# Patient Record
Sex: FEMALE | Race: WHITE | NOT HISPANIC OR LATINO | Employment: FULL TIME | URBAN - METROPOLITAN AREA
[De-identification: names, ages, dates, MRNs, and addresses within clinical notes are randomized per-mention and may not be internally consistent; named-entity substitution may affect disease eponyms.]

---

## 2017-09-13 ENCOUNTER — LAB REQUISITION (OUTPATIENT)
Dept: LAB | Facility: HOSPITAL | Age: 22
End: 2017-09-13

## 2017-09-13 DIAGNOSIS — Z48.817 ENCOUNTER FOR SURGICAL AFTERCARE FOLLOWING SURGERY OF SKIN OR SUBCUTANEOUS TISSUE: ICD-10-CM

## 2017-09-13 PROCEDURE — 87077 CULTURE AEROBIC IDENTIFY: CPT | Performed by: DERMATOLOGY

## 2017-09-13 PROCEDURE — 87186 SC STD MICRODIL/AGAR DIL: CPT | Performed by: DERMATOLOGY

## 2017-09-13 PROCEDURE — 87147 CULTURE TYPE IMMUNOLOGIC: CPT | Performed by: DERMATOLOGY

## 2017-09-13 PROCEDURE — 87070 CULTURE OTHR SPECIMN AEROBIC: CPT | Performed by: DERMATOLOGY

## 2017-09-13 PROCEDURE — 87205 SMEAR GRAM STAIN: CPT | Performed by: DERMATOLOGY

## 2017-09-17 LAB
BACTERIA SPEC AEROBE CULT: ABNORMAL
GRAM STN SPEC: ABNORMAL

## 2021-02-24 ENCOUNTER — OFFICE VISIT (OUTPATIENT)
Dept: OBSTETRICS AND GYNECOLOGY | Facility: CLINIC | Age: 26
End: 2021-02-24

## 2021-02-24 VITALS — WEIGHT: 147 LBS | HEIGHT: 63 IN | BODY MASS INDEX: 26.05 KG/M2

## 2021-02-24 DIAGNOSIS — N92.6 IRREGULAR MENSES: ICD-10-CM

## 2021-02-24 DIAGNOSIS — L68.0 FEMALE HIRSUTISM: ICD-10-CM

## 2021-02-24 DIAGNOSIS — Z01.419 ENCOUNTER FOR ANNUAL ROUTINE GYNECOLOGICAL EXAMINATION: Primary | ICD-10-CM

## 2021-02-24 PROCEDURE — 99395 PREV VISIT EST AGE 18-39: CPT | Performed by: NURSE PRACTITIONER

## 2021-02-24 PROCEDURE — 99213 OFFICE O/P EST LOW 20 MIN: CPT | Performed by: NURSE PRACTITIONER

## 2021-02-24 RX ORDER — FLUOXETINE HYDROCHLORIDE 20 MG/1
CAPSULE ORAL
COMMUNITY
Start: 2021-02-11

## 2021-02-24 NOTE — PROGRESS NOTES
GYN Annual Exam     CC- Here for annual exam.     Subjective     HPI  Shari Jordan is a 25 y.o. female established patient who presents for annual well woman exam. Her periods are irregular, occurring every 3 months, lasting 4 days and are moderate and clots are present.  She reports moderate dysmenorrhea.  SPartner Status: Marital Status: engaged.  New Partners since last visit: YES/NO/Other: no.  Condom usage with IC: never.    Pt c/o irregular periods, occurring approx every 3 months. Pt also c/o hair on her chest, neck and face.    She exercises regularly: yes.  She has concerns about domestic violence: no.    OB History        0    Para   0    Term   0       0    AB   0    Living   0       SAB   0    TAB   0    Ectopic   0    Molar   0    Multiple   0    Live Births   0                Current contraception: none  History of abnormal Pap smear: no  Family history of uterine, colon or ovarian cancer: yes - breast cancer paternal grandmother  Family history of breast cancer: yes - paternal grandmother  H/o STDs: No  Last pap:  Gardasil:uncertain if she received the vaccine  Thromboembolic Disease: none    Health Maintenance   Topic Date Due   • Annual Gynecologic Pelvic and Breast Exam  1997   • ANNUAL PHYSICAL  2000   • HPV VACCINES (1 - 2-dose series) 2008   • TDAP/TD VACCINES (1 - Tdap) 2016   • INFLUENZA VACCINE  2020   • HEPATITIS C SCREENING  2021   • CHLAMYDIA SCREENING  2021   • Pneumococcal Vaccine 0-64  Aged Out   • MENINGOCOCCAL VACCINE  Aged Out       The following portions of the patient's history were reviewed and updated as appropriate: allergies, current medications, past family history, past medical history, past social history, past surgical history and problem list.    Review of Systems  Review of Systems   Constitutional: Negative.    HENT: Negative.         Abn hair growth   Eyes: Negative.    Respiratory: Negative.    Cardiovascular:  "Negative.    Gastrointestinal: Negative.    Endocrine: Negative.    Genitourinary:        Irreg periods   Musculoskeletal: Negative.    Skin: Negative.    Allergic/Immunologic: Negative.    Neurological: Negative.    Hematological: Negative.    Psychiatric/Behavioral: Negative.        I have reviewed and agree with the HPI, ROS, and historical information as entered above. ZOILA Cabezas      Past Medical History:   Diagnosis Date   • Anxiety    • Aortic regurgitation     Mild to Moderate; followed by Dr. Michel   • History of MRSA infection 2011    Right Ring Finger   • Irregular menses    • Pott's disease    • SVT (supraventricular tachycardia) (CMS/HCC)    • Vaccine for human papilloma virus (HPV) types 6, 11, 16, and 18 administered     Gardasil Series Completed        Past Surgical History:   Procedure Laterality Date   • DENTAL PROCEDURE     • OTHER SURGICAL HISTORY      Urethral Surgery - Performed as infant   • WISDOM TOOTH EXTRACTION            Objective   Ht 160 cm (63\")   Wt 66.7 kg (147 lb)   LMP 12/21/2020 (Exact Date)   BMI 26.04 kg/m²     Physical Exam  Vitals signs and nursing note reviewed. Exam conducted with a chaperone present.   Constitutional:       Appearance: Normal appearance. She is well-developed.   HENT:      Head: Normocephalic and atraumatic.      Comments: Mild hirsutism noted along chest and neck  Neck:      Musculoskeletal: Normal range of motion. No muscular tenderness.      Thyroid: No thyroid mass or thyromegaly.   Cardiovascular:      Rate and Rhythm: Normal rate and regular rhythm.      Heart sounds: No murmur.   Pulmonary:      Effort: Pulmonary effort is normal. No retractions.      Breath sounds: Normal breath sounds. No wheezing, rhonchi or rales.   Chest:      Chest wall: No mass or tenderness.      Breasts:         Right: Normal. No mass, nipple discharge, skin change or tenderness.         Left: Normal. No mass, nipple discharge, skin change or tenderness. "   Abdominal:      General: Bowel sounds are normal.      Palpations: Abdomen is soft. Abdomen is not rigid. There is no mass.      Tenderness: There is no abdominal tenderness. There is no guarding.      Hernia: No hernia is present. There is no hernia in the left inguinal area.   Genitourinary:     Labia:         Right: No rash, tenderness or lesion.         Left: No rash, tenderness or lesion.       Vagina: Normal. No vaginal discharge or lesions.      Cervix: No cervical motion tenderness, discharge, lesion or cervical bleeding.      Uterus: Normal. Not enlarged, not fixed and not tender.       Adnexa:         Right: No mass or tenderness.          Left: No mass or tenderness.        Rectum: No external hemorrhoid.   Neurological:      Mental Status: She is alert and oriented to person, place, and time.   Psychiatric:         Behavior: Behavior normal.          Assessment   Assessment  Problem List Items Addressed This Visit     None      Visit Diagnoses     Encounter for annual routine gynecological examination    -  Primary    Relevant Orders    Liquid-based Pap Smear, Screening    Irregular menses        Relevant Orders    Insulin, Random    Glucose, Random    CBC (No Diff)    DHEA-Sulfate    17-Hydroxyprogesterone    Testosterone - total    TSH    FSH & LH    Hemoglobin A1c     Non-ob Transvaginal    Female hirsutism        Relevant Orders    Insulin, Random    Glucose, Random    CBC (No Diff)    DHEA-Sulfate    17-Hydroxyprogesterone    Testosterone - total    TSH    FSH & LH    Hemoglobin A1c            Plan     1. Encouraged use of condoms for STD prevention.  2. Reviewed monthly self breast exams.  Instructed to call with lumps, pain, or breast discharge.    3. Reviewed exercise as a preventative health measures.   4. Reccommended Flu Vaccine in Fall of each year.  5. Other: RTC for pelvic U/S and review labs, evaluate irregular menses.          Tami Abernathy, ZOILA  02/24/2021

## 2021-03-01 LAB
17OHP SERPL-MCNC: 229 NG/DL
DHEA-S SERPL-MCNC: 599 UG/DL (ref 110–431.7)
ERYTHROCYTE [DISTWIDTH] IN BLOOD BY AUTOMATED COUNT: 12.4 % (ref 12.3–15.4)
FSH SERPL-ACNC: 2.2 MIU/ML
GLUCOSE SERPL-MCNC: 77 MG/DL (ref 65–99)
HBA1C MFR BLD: 4.9 % (ref 4.8–5.6)
HCT VFR BLD AUTO: 39.2 % (ref 34–46.6)
HGB BLD-MCNC: 13.1 G/DL (ref 12–15.9)
INSULIN SERPL-ACNC: 6.7 UIU/ML
LH SERPL-ACNC: 3.5 MIU/ML
MCH RBC QN AUTO: 31.3 PG (ref 26.6–33)
MCHC RBC AUTO-ENTMCNC: 33.4 G/DL (ref 31.5–35.7)
MCV RBC AUTO: 93.8 FL (ref 79–97)
PLATELET # BLD AUTO: 368 10*3/MM3 (ref 140–450)
RBC # BLD AUTO: 4.18 10*6/MM3 (ref 3.77–5.28)
TESTOST SERPL-MCNC: 37 NG/DL (ref 8–48)
TSH SERPL DL<=0.005 MIU/L-ACNC: 1.07 UIU/ML (ref 0.27–4.2)
WBC # BLD AUTO: 7.16 10*3/MM3 (ref 3.4–10.8)

## 2021-03-04 DIAGNOSIS — Z01.419 ENCOUNTER FOR ANNUAL ROUTINE GYNECOLOGICAL EXAMINATION: ICD-10-CM

## 2021-03-10 ENCOUNTER — OFFICE VISIT (OUTPATIENT)
Dept: OBSTETRICS AND GYNECOLOGY | Facility: CLINIC | Age: 26
End: 2021-03-10

## 2021-03-10 VITALS
DIASTOLIC BLOOD PRESSURE: 80 MMHG | HEIGHT: 63 IN | WEIGHT: 148 LBS | SYSTOLIC BLOOD PRESSURE: 118 MMHG | BODY MASS INDEX: 26.22 KG/M2

## 2021-03-10 DIAGNOSIS — N92.6 IRREGULAR MENSES: ICD-10-CM

## 2021-03-10 DIAGNOSIS — E28.2 PCOS (POLYCYSTIC OVARIAN SYNDROME): Primary | ICD-10-CM

## 2021-03-10 DIAGNOSIS — L68.0 FEMALE HIRSUTISM: ICD-10-CM

## 2021-03-10 PROCEDURE — 99213 OFFICE O/P EST LOW 20 MIN: CPT | Performed by: NURSE PRACTITIONER

## 2021-03-10 RX ORDER — DESOGESTREL AND ETHINYL ESTRADIOL 0.15-0.03
1 KIT ORAL DAILY
Qty: 28 TABLET | Refills: 12 | Status: SHIPPED | OUTPATIENT
Start: 2021-03-10 | End: 2022-03-10

## 2021-03-10 NOTE — PROGRESS NOTES
"Chief Complaint   Patient presents with   • Follow-up     irregular menses            HPI  Shari Jordan is a 25 y.o. female, , who presents for follow up U/S to evaluate irregular menses which was discussed at a recent annual.     Pt states her periods are irregular, occurring every 6-12 weeks. She just finished her cycle yesterday. She also complains of abnormal hair growth on her face, neck and chest.     She has tried OCP's in the past, approx 2 years ago and she states she didn't do well with them, but she is willing to try them again.     Pt is getting  in , and may plan to conceive shortly thereafter.     Did the patient have u/s today? Yes.     Additional OB/GYN History   Last Pap :   Last Completed Pap Smear       Status Date      PAP SMEAR Done 2021 SCANNED - PAP SMEAR        History of abnormal Pap smear: no  Tobacco Usage?: No     The additional following portions of the patient's history were reviewed and updated as appropriate: allergies, current medications, past family history, past medical history, past social history, past surgical history and problem list.    Review of Systems   Constitutional: Negative.    HENT:        Hirsutism    Eyes: Negative.    Respiratory: Negative.    Cardiovascular: Negative.    Gastrointestinal: Negative.    Endocrine: Negative.    Genitourinary:        Irregular menses   Musculoskeletal: Negative.    Skin: Negative.    Allergic/Immunologic: Negative.    Neurological: Negative.    Hematological: Negative.    Psychiatric/Behavioral: Negative.      All other systems reviewed and are negative.     I have reviewed and agree with the HPI, ROS, and historical information as entered above. Tami Abernathy, APRN    Objective   /80   Ht 160 cm (63\")   Wt 67.1 kg (148 lb)   LMP 2021 (Exact Date)   BMI 26.22 kg/m²     Physical Exam  Vitals and nursing note reviewed.   Constitutional:       Appearance: Normal appearance.   HENT:      Head: " Normocephalic.   Pulmonary:      Effort: Pulmonary effort is normal.   Neurological:      Mental Status: She is alert and oriented to person, place, and time.   Psychiatric:         Behavior: Behavior normal.            Assessment and Plan    Problem List Items Addressed This Visit     None      Visit Diagnoses     PCOS (polycystic ovarian syndrome)    -  Primary    Irregular menses        Female hirsutism              1. We reviewed PCOS panel that was drawn last visit, DHEA-S elevated. Combined with irregular menses and hirsutism, likely PCOS. Her U/S today was normal. Pt agreeable to try OCPs again. Reviewed use, side effects and risks. Pt to call with any issues.     Tami Abernathy, ZOILA  03/10/2021

## 2021-04-30 ENCOUNTER — TELEPHONE (OUTPATIENT)
Dept: OBSTETRICS AND GYNECOLOGY | Facility: CLINIC | Age: 26
End: 2021-04-30

## 2021-04-30 NOTE — TELEPHONE ENCOUNTER
Patient states in March she started a birthcontrol pill, she states for the past three weeks her blood pressure has been elevated around 132/102, she states mostly the bottom number states over 95, and she is getting blurry vision. Is wondering if this or normal with this birthcontrol or not.

## 2021-05-03 ENCOUNTER — OFFICE VISIT (OUTPATIENT)
Dept: OBSTETRICS AND GYNECOLOGY | Facility: CLINIC | Age: 26
End: 2021-05-03

## 2021-05-03 VITALS — WEIGHT: 149.4 LBS | BODY MASS INDEX: 26.47 KG/M2 | SYSTOLIC BLOOD PRESSURE: 138 MMHG | DIASTOLIC BLOOD PRESSURE: 84 MMHG

## 2021-05-03 DIAGNOSIS — R03.0 BLOOD PRESSURE ELEVATED WITHOUT HISTORY OF HTN: ICD-10-CM

## 2021-05-03 DIAGNOSIS — E28.2 PCOS (POLYCYSTIC OVARIAN SYNDROME): Primary | ICD-10-CM

## 2021-05-03 PROCEDURE — 99213 OFFICE O/P EST LOW 20 MIN: CPT | Performed by: NURSE PRACTITIONER

## 2021-05-03 RX ORDER — METFORMIN HYDROCHLORIDE 750 MG/1
TABLET, EXTENDED RELEASE ORAL
Qty: 60 TABLET | Refills: 6 | Status: SHIPPED | OUTPATIENT
Start: 2021-05-03

## 2021-05-03 NOTE — PROGRESS NOTES
"Chief Complaint   Patient presents with   • Follow-up     Discuss issues with OCPs       Subjective   HPI  Shari Jordan is a 25 y.o. female, , who presents today to discuss issues with current OCP.    She was given APRI at her last visit with us on 3/10/2021.     She reports that she began having issues with elevated blood pressures in March.  Blood pressures have averaged 130s/90s with the highest being 145/113.  She reports that she went the to ER in mid March as she had a \"squeezing\" sensation in her wrist, was very hot, and nauseous.  She reports that her blood pressure was so severely elevated that they gave her ASA, and told her to f/u with PCP.  When she followed up, she was recommended to see Cardio as she has a heart condition.  She says that she has a normal Echo back in the fall, and has not yet seen them. At the time of the ER visit, she had already began taking OCP.      She says that she has also began having vision changes. Vision changes began approximately three weeks ago, and she describes as a blurry line in her left eye.        When she called on Friday to schedule her appointment, she was told to d/c OCP. She said that she was at the end of current pack at time of call, and was due to start new pack today, has not started.       Since beginning OCP, periods have become regular every 30 days, lasting for 5 days.  She denies dysmenorrhea.      Additional OB/GYN History   Current contraception: contraceptive methods: APRI  Desires to: discuss contraception    Tobacco Usage?: No   OB History        0    Para   0    Term   0       0    AB   0    Living   0       SAB   0    TAB   0    Ectopic   0    Molar   0    Multiple   0    Live Births   0                Health Maintenance   Topic Date Due   • ANNUAL PHYSICAL  Never done   • HPV VACCINES (1 - 2-dose series) Never done   • COVID-19 Vaccine (1) Never done   • TDAP/TD VACCINES (1 - Tdap) Never done   • HEPATITIS C SCREENING  " Never done   • CHLAMYDIA SCREENING  Never done   • INFLUENZA VACCINE  08/01/2021   • Annual Gynecologic Pelvic and Breast Exam  02/25/2022   • PAP SMEAR  02/24/2024   • Pneumococcal Vaccine 0-64  Aged Out       The additional following portions of the patient's history were reviewed and updated as appropriate: allergies, current medications, past family history, past medical history, past social history, past surgical history and problem list.    Review of Systems   Constitutional:        Elevated BP   HENT: Negative.    Eyes: Positive for visual disturbance.   Respiratory: Negative.    Cardiovascular: Negative.    Gastrointestinal: Negative.    Genitourinary: Negative.    Musculoskeletal: Negative.    Skin: Negative.    Allergic/Immunologic: Negative.    Neurological: Negative.    Hematological: Negative.    Psychiatric/Behavioral: Negative.        I have reviewed and agree with the HPI, ROS, and historical information as entered above. Tami Abernathy, APRN    Objective   /84   Wt 67.8 kg (149 lb 6.4 oz)   LMP 04/26/2021 (Exact Date)   Breastfeeding No   BMI 26.47 kg/m²     Physical Exam  Vitals and nursing note reviewed.   Constitutional:       Appearance: Normal appearance.   HENT:      Head: Normocephalic and atraumatic.   Pulmonary:      Effort: Pulmonary effort is normal.   Neurological:      Mental Status: She is alert and oriented to person, place, and time.   Psychiatric:         Behavior: Behavior normal.         Assessment/Plan     Assessment     Problem List Items Addressed This Visit     None      Visit Diagnoses     PCOS (polycystic ovarian syndrome)    -  Primary    Blood pressure elevated without history of HTN              Plan     1. Elevated blood pressure on OCP's. Will discontinue, and pt plans to follow up with her PCP. We discussed other treatment options for PCOS. Pt does desire pregnancy this fall, therefore we will start Metformin. Reviewed side effects, and encouraged to titrate up  over the next several weeks, and take with food. Enc to track periods. Call with any issues.       ZOILA Cabezas  05/03/2021

## 2024-08-02 ENCOUNTER — OFFICE VISIT (OUTPATIENT)
Dept: NEUROLOGY | Facility: CLINIC | Age: 29
End: 2024-08-02
Payer: COMMERCIAL

## 2024-08-02 VITALS
OXYGEN SATURATION: 98 % | WEIGHT: 158.2 LBS | BODY MASS INDEX: 28.03 KG/M2 | SYSTOLIC BLOOD PRESSURE: 108 MMHG | HEIGHT: 63 IN | DIASTOLIC BLOOD PRESSURE: 78 MMHG | HEART RATE: 92 BPM

## 2024-08-02 DIAGNOSIS — H93.A9 PULSATILE TINNITUS: ICD-10-CM

## 2024-08-02 DIAGNOSIS — R40.4 TRANSIENT ALTERATION OF AWARENESS: ICD-10-CM

## 2024-08-02 DIAGNOSIS — R51.9 NEW ONSET HEADACHE: Primary | ICD-10-CM

## 2024-08-02 PROCEDURE — 99204 OFFICE O/P NEW MOD 45 MIN: CPT | Performed by: NURSE PRACTITIONER

## 2024-08-02 RX ORDER — BUPROPION HYDROCHLORIDE 150 MG/1
150 TABLET ORAL
COMMUNITY
Start: 2024-07-26 | End: 2024-08-02

## 2024-08-02 NOTE — PROGRESS NOTES
Neuro Office Visit      Encounter Date: 2024   Patient Name: Shari Kramer  : 1995   MRN: [id number]   PCP: ZOILA Still  Chief Complaint:    Chief Complaint   Patient presents with    Headache       History of Present Illness: Shari Kramer is a 29 y.o. female who is here today in Neurology for  headaches.    History of Present Illness  The patient presents for evaluation of headaches. She is accompanied by her mother.  Headaches  Onset   She began experiencing headaches two weeks ago, with no known triggers. She denies any history of car accidents, traumas, or dental procedures. She was started weaned off of prozac and started on Wellbutrin one month prior to onset of sx. She stopped Wellbutrin after one week.    Initially, the headaches were sporadic, but they have now become a daily occurrence. She describes her headaches as sharp and intense, similar to having eaten something cold, and lasting less than 60 seconds. During these episodes, she feels exhausted and irritable, which occur twice daily. Despite taking Tylenol, the headaches persist. She has checked her blood pressure during these episodes, which have consistently acceptable on the high side. She denies any changes in vision, blurred vision, or slurred speech during these episodes, but occasionally experiences difficulty speaking and brain fog. She denies any numbness, tingling, or weakness on one side during these episodes. She also experiences palpitations during these episodes, with her pulse typically in the 90s to 100s. She denies any changes in taste or smell prior to the onset of these symptoms but does have symptoms of de ja vu. She recently traveled to [place] after the sx started. She occasionally hears her pulse in her ear, particularly when she is anxious. She denies any loss of consciousness.    Mother provides history that as a child pt developed some paralysis and blue leg and staring episodes. Seen at  [facility] by neuro. Diagnosed with complex regional pain syndrome    Supplemental Information  She was diagnosed with 3 valvular dysfunctions and POTS syndrome. She has well controlled anxiety. She sees a psychiatrist or therapist. She is seeing Yarely Quiñones NP. She is seeing a [facility] at the [facility] in 01/2024.    SOCIAL HISTORY  She drinks 1 coffee a day. She does not drink energy drinks. She drinks about 4 bottles of water a day. She sleeps well at night. She snores at night sometimes. She denies any seizures. Her last period was in 07/2023. 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 Does not like medication.s    FAMILY HISTORY  She denies any family history of migraines or seizures. Her great grandfather had MS. Her grandfather developed transverse myelitis.  Headaches  Onset July 9       PMH: bicuspid aortic valve, mild, aortic insuff and SVT, POTS  FH: MGF with MS  Subjective      Past Medical History:   Past Medical History:   Diagnosis Date    Anxiety     Aortic regurgitation     Mild to Moderate; followed by Dr. Michel    History of MRSA infection 2011    Right Ring Finger    Irregular menses     Mitral valve prolapse     Pott's disease     SVT (supraventricular tachycardia)     Vaccine for human papilloma virus (HPV) types 6, 11, 16, and 18 administered     Gardasil Series Completed       Past Surgical History:   Past Surgical History:   Procedure Laterality Date    DENTAL PROCEDURE      OTHER SURGICAL HISTORY      Urethral Surgery - Performed as infant    WISDOM TOOTH EXTRACTION         Family History:   Family History   Problem Relation Age of Onset    Arthritis Mother     Hypertension Mother     Diabetes type II Father     Hypertension Father     Asthma Brother     Arthritis Maternal Grandmother     Diabetes type II Maternal Grandfather     Hyperlipidemia Maternal Grandfather     Heart disease Paternal Grandfather     Hypertension Paternal Grandfather     Breast cancer  Paternal Grandfather        Social History:   Social History     Socioeconomic History    Marital status:    Tobacco Use    Smoking status: Never    Smokeless tobacco: Never   Vaping Use    Vaping status: Never Used   Substance and Sexual Activity    Alcohol use: Not Currently    Drug use: Never    Sexual activity: Yes     Partners: Male     Birth control/protection: OCP       Medications:   No current outpatient medications on file.    Allergies:   Allergies   Allergen Reactions    Diphenhydramine Unknown (See Comments)    Lidocaine Unknown (See Comments)    Procaine Palpitations       PHQ-9 Total Score:     STEADI Fall Risk Assessment has not been completed.    Objective     Physical Exam:   Physical Exam  Neurological:      Mental Status: She is oriented to person, place, and time.      Coordination: Finger-Nose-Finger Test, Heel to Shin Test and Romberg Test normal.      Gait: Gait is intact. Tandem walk normal.      Deep Tendon Reflexes:      Reflex Scores:       Tricep reflexes are 2+ on the right side and 2+ on the left side.       Bicep reflexes are 2+ on the right side and 2+ on the left side.       Brachioradialis reflexes are 2+ on the right side and 2+ on the left side.       Patellar reflexes are 2+ on the right side and 2+ on the left side.       Achilles reflexes are 2+ on the right side and 2+ on the left side.  Psychiatric:         Speech: Speech normal.         Neurologic Exam     Mental Status   Oriented to person, place, and time.   Follows 3 step commands.   Attention: normal. Concentration: normal.   Speech: speech is normal   Level of consciousness: alert  Knowledge: consistent with education.   Normal comprehension.     Cranial Nerves     CN III, IV, VI   CN III: no CN III palsy  CN VI: no CN VI palsy  Nystagmus: none   Diplopia: none  Upgaze: normal  Downgaze: normal  Conjugate gaze: present    CN VII   Facial expression full, symmetric.     CN VIII   Hearing: intact    CN XII   CN  "XII normal.     Motor Exam   Muscle bulk: normal  Overall muscle tone: normal    Strength   Right biceps: 5/5  Left biceps: 5/5  Right triceps: 5/5  Left triceps: 5/5  Right interossei: 5/5  Left interossei: 5/5  Right quadriceps: 5/5  Left quadriceps: 5/5  Right anterior tibial: 5/5  Left anterior tibial: 5/5  Right posterior tibial: 5/5  Left posterior tibial: 5/5    Sensory Exam   Light touch normal.     Gait, Coordination, and Reflexes     Gait  Gait: normal    Coordination   Romberg: negative  Finger to nose coordination: normal  Heel to shin coordination: normal  Tandem walking coordination: normal    Tremor   Resting tremor: absent  Action tremor: absent    Reflexes   Right brachioradialis: 2+  Left brachioradialis: 2+  Right biceps: 2+  Left biceps: 2+  Right triceps: 2+  Left triceps: 2+  Right patellar: 2+  Left patellar: 2+  Right achilles: 2+  Left achilles: 2+  Right : 2+  Left : 2+     Physical Exam        Vital Signs:   Vitals:    08/02/24 1401   BP: 108/78   Pulse: 92   SpO2: 98%   Weight: 71.8 kg (158 lb 3.2 oz)   Height: 160 cm (63\")     Body mass index is 28.02 kg/m².         Assessment / Plan      Assessment/Plan:   Diagnoses and all orders for this visit:    1. New onset headache (Primary)  -     MRI Brain With & Without Contrast; Future  -     MRI angiogram venogram head; Future  -     Sedimentation Rate; Future  -     C-reactive Protein; Future  -     Vitamin B12 & Folate; Future    2. Pulsatile tinnitus  -     MRI Brain With & Without Contrast; Future  -     MRI angiogram venogram head; Future  -     Sedimentation Rate; Future  -     C-reactive Protein; Future  -     Vitamin B12 & Folate; Future    3. Transient alteration of awareness  -     MRI Brain With & Without Contrast; Future  -     EEG (Hospital Performed); Future  -     MRI angiogram venogram head; Future       Assessment & Plan  1. New onset headache and transient alteration of awareness.  The symptoms do not align with a " migraine, but rather an underlying issue. Seizures may also be considered. An MRI of the brain with and without contrast and an MRV venogram have been ordered, along with an EEG. Sed rate, CRP, B12, folate, and thyroid tests have been ordered. No additional medications are prescribed at this time. Wellbutrin has been discontinued. She is advised to continue her driving and working activities, but should pull over, sit down, and pay more attention to her feelings. Should the insurance not cover the MRI, a CTA will be considered.    Follow-up  A follow-up visit is scheduled for 12 weeks from now.        Patient Education:       Reviewed medications, potential side effects and signs and symptoms to report. Discussed risk versus benefits of treatment plan with patient and/or family-including medications, labs and radiology that may be ordered. Addressed questions and concerns during visit. Patient and/or family verbalized understanding and agree with plan. Instructed to call the office with any questions and report to ER with any life-threatening symptoms.     Follow Up:   Return in about 3 months (around 11/2/2024) for Recheck.    During this visit the following were done:  Labs Reviewed []    Labs Ordered []    Radiology Reports Reviewed []    Radiology Ordered []    PCP Records Reviewed []    Referring Provider Records Reviewed []    ER Records Reviewed []    Hospital Records Reviewed []    History Obtained From Family []    Radiology Images Reviewed []    Other Reviewed []    Records Requested []      Patient or patient representative verbalized consent for the use of Ambient Listening during the visit with  Brian Yung DNP, ZOILA for chart documentation. 8/2/2024  08:29 EDT      Brian Yung DNP, APRN

## 2024-08-05 ENCOUNTER — APPOINTMENT (OUTPATIENT)
Dept: CT IMAGING | Facility: HOSPITAL | Age: 29
End: 2024-08-05
Payer: COMMERCIAL

## 2024-08-05 ENCOUNTER — HOSPITAL ENCOUNTER (EMERGENCY)
Facility: HOSPITAL | Age: 29
Discharge: HOME | End: 2024-08-06
Attending: EMERGENCY MEDICINE | Admitting: EMERGENCY MEDICINE
Payer: COMMERCIAL

## 2024-08-05 DIAGNOSIS — G43.009 ATYPICAL MIGRAINE: Primary | ICD-10-CM

## 2024-08-05 PROCEDURE — 99283 EMERGENCY DEPT VISIT LOW MDM: CPT

## 2024-08-05 RX ORDER — DEXAMETHASONE SODIUM PHOSPHATE 4 MG/ML
4 INJECTION, SOLUTION INTRA-ARTICULAR; INTRALESIONAL; INTRAMUSCULAR; INTRAVENOUS; SOFT TISSUE ONCE
Status: COMPLETED | OUTPATIENT
Start: 2024-08-06 | End: 2024-08-06

## 2024-08-05 RX ORDER — SODIUM CHLORIDE 0.9 % (FLUSH) 0.9 %
10 SYRINGE (ML) INJECTION AS NEEDED
Status: DISCONTINUED | OUTPATIENT
Start: 2024-08-05 | End: 2024-08-06 | Stop reason: HOSPADM

## 2024-08-05 RX ORDER — KETOROLAC TROMETHAMINE 30 MG/ML
30 INJECTION, SOLUTION INTRAMUSCULAR; INTRAVENOUS ONCE
Status: COMPLETED | OUTPATIENT
Start: 2024-08-06 | End: 2024-08-06

## 2024-08-06 VITALS
HEART RATE: 88 BPM | HEIGHT: 63 IN | WEIGHT: 158 LBS | DIASTOLIC BLOOD PRESSURE: 72 MMHG | OXYGEN SATURATION: 99 % | SYSTOLIC BLOOD PRESSURE: 117 MMHG | BODY MASS INDEX: 28 KG/M2 | TEMPERATURE: 97.9 F | RESPIRATION RATE: 16 BRPM

## 2024-08-06 LAB
ALBUMIN SERPL-MCNC: 4.3 G/DL (ref 3.5–5.2)
ALBUMIN/GLOB SERPL: 1.4 G/DL
ALP SERPL-CCNC: 102 U/L (ref 39–117)
ALT SERPL W P-5'-P-CCNC: 14 U/L (ref 1–33)
ANION GAP SERPL CALCULATED.3IONS-SCNC: 10 MMOL/L (ref 5–15)
AST SERPL-CCNC: 19 U/L (ref 1–32)
BASOPHILS # BLD AUTO: 0.04 10*3/MM3 (ref 0–0.2)
BASOPHILS NFR BLD AUTO: 0.4 % (ref 0–1.5)
BILIRUB SERPL-MCNC: <0.2 MG/DL (ref 0–1.2)
BUN SERPL-MCNC: 12 MG/DL (ref 6–20)
BUN/CREAT SERPL: 16.2 (ref 7–25)
CALCIUM SPEC-SCNC: 9.5 MG/DL (ref 8.6–10.5)
CHLORIDE SERPL-SCNC: 103 MMOL/L (ref 98–107)
CO2 SERPL-SCNC: 28 MMOL/L (ref 22–29)
CREAT SERPL-MCNC: 0.74 MG/DL (ref 0.57–1)
DEPRECATED RDW RBC AUTO: 43.7 FL (ref 37–54)
EGFRCR SERPLBLD CKD-EPI 2021: 113.9 ML/MIN/1.73
EOSINOPHIL # BLD AUTO: 0.14 10*3/MM3 (ref 0–0.4)
EOSINOPHIL NFR BLD AUTO: 1.3 % (ref 0.3–6.2)
ERYTHROCYTE [DISTWIDTH] IN BLOOD BY AUTOMATED COUNT: 12.7 % (ref 12.3–15.4)
GLOBULIN UR ELPH-MCNC: 3.1 GM/DL
GLUCOSE SERPL-MCNC: 105 MG/DL (ref 65–99)
HCT VFR BLD AUTO: 37.5 % (ref 34–46.6)
HGB BLD-MCNC: 12.4 G/DL (ref 12–15.9)
IMM GRANULOCYTES # BLD AUTO: 0.02 10*3/MM3 (ref 0–0.05)
IMM GRANULOCYTES NFR BLD AUTO: 0.2 % (ref 0–0.5)
LYMPHOCYTES # BLD AUTO: 3.18 10*3/MM3 (ref 0.7–3.1)
LYMPHOCYTES NFR BLD AUTO: 29.4 % (ref 19.6–45.3)
MCH RBC QN AUTO: 30.7 PG (ref 26.6–33)
MCHC RBC AUTO-ENTMCNC: 33.1 G/DL (ref 31.5–35.7)
MCV RBC AUTO: 92.8 FL (ref 79–97)
MONOCYTES # BLD AUTO: 0.86 10*3/MM3 (ref 0.1–0.9)
MONOCYTES NFR BLD AUTO: 7.9 % (ref 5–12)
NEUTROPHILS NFR BLD AUTO: 6.58 10*3/MM3 (ref 1.7–7)
NEUTROPHILS NFR BLD AUTO: 60.8 % (ref 42.7–76)
NRBC BLD AUTO-RTO: 0 /100 WBC (ref 0–0.2)
PLATELET # BLD AUTO: 408 10*3/MM3 (ref 140–450)
PMV BLD AUTO: 9.8 FL (ref 6–12)
POTASSIUM SERPL-SCNC: 4.2 MMOL/L (ref 3.5–5.2)
PROT SERPL-MCNC: 7.4 G/DL (ref 6–8.5)
RBC # BLD AUTO: 4.04 10*6/MM3 (ref 3.77–5.28)
SODIUM SERPL-SCNC: 141 MMOL/L (ref 136–145)
WBC NRBC COR # BLD AUTO: 10.82 10*3/MM3 (ref 3.4–10.8)

## 2024-08-06 PROCEDURE — 25010000002 DEXAMETHASONE PER 1 MG: Performed by: EMERGENCY MEDICINE

## 2024-08-06 PROCEDURE — 85025 COMPLETE CBC W/AUTO DIFF WBC: CPT | Performed by: EMERGENCY MEDICINE

## 2024-08-06 PROCEDURE — 25010000002 KETOROLAC TROMETHAMINE PER 15 MG: Performed by: EMERGENCY MEDICINE

## 2024-08-06 PROCEDURE — 80053 COMPREHEN METABOLIC PANEL: CPT | Performed by: EMERGENCY MEDICINE

## 2024-08-06 PROCEDURE — 96375 TX/PRO/DX INJ NEW DRUG ADDON: CPT

## 2024-08-06 PROCEDURE — 96374 THER/PROPH/DIAG INJ IV PUSH: CPT

## 2024-08-06 PROCEDURE — 25810000003 SODIUM CHLORIDE 0.9 % SOLUTION: Performed by: EMERGENCY MEDICINE

## 2024-08-06 RX ORDER — BUTALBITAL, ACETAMINOPHEN AND CAFFEINE 50; 325; 40 MG/1; MG/1; MG/1
1 TABLET ORAL EVERY 6 HOURS PRN
Qty: 8 TABLET | Refills: 0 | Status: SHIPPED | OUTPATIENT
Start: 2024-08-06 | End: 2024-08-08

## 2024-08-06 RX ADMIN — KETOROLAC TROMETHAMINE 30 MG: 30 INJECTION, SOLUTION INTRAMUSCULAR; INTRAVENOUS at 00:10

## 2024-08-06 RX ADMIN — DEXAMETHASONE SODIUM PHOSPHATE 4 MG: 4 INJECTION INTRA-ARTICULAR; INTRALESIONAL; INTRAMUSCULAR; INTRAVENOUS; SOFT TISSUE at 00:10

## 2024-08-06 RX ADMIN — SODIUM CHLORIDE 1000 ML: 9 INJECTION, SOLUTION INTRAVENOUS at 00:11

## 2024-08-06 NOTE — ED PROVIDER NOTES
Subjective   History of Present Illness  Is a 29-year-old female with past medical history of anxiety presented to the emergency department with a headache.  The patient is had a frontal headache for the last 4 weeks or so.  She states that she has been following with neurology.  Patient got concerned today because she started having some pressure in her frontal area and felt like her eyes were twitching.  She has not normally had visual issues with her headache.  States that those lasted for few seconds and then resolved.  She has not had any fevers or chills.  No focal weakness.  No chest pain or shortness of breath.  No abdominal pain or vomiting    History provided by:  Patient   used: No        Review of Systems   Constitutional:  Negative for chills and fever.   HENT:  Negative for congestion, ear pain and sore throat.    Eyes:  Negative for visual disturbance.   Respiratory:  Negative for shortness of breath.    Cardiovascular:  Negative for chest pain.   Gastrointestinal:  Negative for abdominal pain.   Genitourinary:  Negative for difficulty urinating.   Musculoskeletal:  Negative for arthralgias.   Skin:  Negative for rash.   Neurological:  Positive for headaches. Negative for dizziness, weakness and numbness.   Psychiatric/Behavioral:  Negative for agitation.        Past Medical History:   Diagnosis Date    Anxiety     Aortic regurgitation     Mild to Moderate; followed by Dr. Michel    History of MRSA infection 2011    Right Ring Finger    Irregular menses     Mitral valve prolapse     Pott's disease     SVT (supraventricular tachycardia)     Vaccine for human papilloma virus (HPV) types 6, 11, 16, and 18 administered     Gardasil Series Completed       Allergies   Allergen Reactions    Diphenhydramine Unknown (See Comments)    Lidocaine Unknown (See Comments)    Procaine Palpitations       Past Surgical History:   Procedure Laterality Date    DENTAL PROCEDURE      OTHER SURGICAL  HISTORY      Urethral Surgery - Performed as infant    WISDOM TOOTH EXTRACTION         Family History   Problem Relation Age of Onset    Arthritis Mother     Hypertension Mother     Diabetes type II Father     Hypertension Father     Asthma Brother     Arthritis Maternal Grandmother     Diabetes type II Maternal Grandfather     Hyperlipidemia Maternal Grandfather     Heart disease Paternal Grandfather     Hypertension Paternal Grandfather     Breast cancer Paternal Grandfather        Social History     Socioeconomic History    Marital status:    Tobacco Use    Smoking status: Never    Smokeless tobacco: Never   Vaping Use    Vaping status: Never Used   Substance and Sexual Activity    Alcohol use: Not Currently    Drug use: Never    Sexual activity: Yes     Partners: Male     Birth control/protection: OCP           Objective   Physical Exam  Vitals and nursing note reviewed.   Constitutional:       General: She is not in acute distress.     Appearance: She is not ill-appearing or toxic-appearing.   HENT:      Mouth/Throat:      Pharynx: No posterior oropharyngeal erythema.   Eyes:      Conjunctiva/sclera: Conjunctivae normal.      Pupils: Pupils are equal, round, and reactive to light.   Cardiovascular:      Rate and Rhythm: Normal rate and regular rhythm.   Pulmonary:      Effort: Pulmonary effort is normal. No respiratory distress.   Abdominal:      General: Abdomen is flat. There is no distension.      Palpations: There is no mass.      Tenderness: There is no abdominal tenderness. There is no guarding or rebound.   Musculoskeletal:         General: No deformity. Normal range of motion.   Skin:     General: Skin is warm.      Findings: No rash.   Neurological:      General: No focal deficit present.      Mental Status: She is alert and oriented to person, place, and time.      Motor: No weakness.         Procedures           ED Course  ED Course as of 08/06/24 0121   Tue Aug 06, 2024   0118 BP: 132/90 [JK]    0118 Temp: 97.9 °F (36.6 °C) [JK]   0118 Temp src: Oral [JK]   0118 Heart Rate: 97 [JK]   0118 Resp: 16 [JK]   0118 SpO2: 99 %  Interpretation:  Patient's repeat vitals, telemetry tracing, and pulse oximetry tracing were directly viewed and interpreted by myself.   O2 sat 99% on room air, interpreted as normal  Telemetry rhythm strip interpreted as normal sinus rhythm, with a rate of 97 bpm. [JK]   0119 CBC & Differential(!) [JK]   0119 Comprehensive Metabolic Panel(!)  Interpretation:  Laboratory studies were reviewed and interpreted directly by myself.  CMP was unremarkable, CBC normal [JK]   0119 Patient is declining head CT at this time [JK]   0119 On reevaluation, the patient's pain is improved. They are not having any worsening headache. There have been no episodes of witnessed vomiting in the Emergency Department. Given the history and physical exam, the headache is not consistent with CVA, subarachnoid hemorrhage, acute intracranial catastrophe, meningitis, encephalitis, intracranial abscess. Neurologic exam is nonfocal. The patient improved with migraine treatment here. I did discuss further evaluation with lumbar puncture, however, patient declined at this time as the symptoms have significantly improved.    [JK]   0119 I had a discussion with the patient/family regarding diagnosis, diagnostic results, treatment plan, and medications. The patient/family indicated understanding of these instructions. I spent adequate time at the bedside prior to discharge necessary to discuss the aftercare instructions, giving patient education, providing explanations of the results of our evaluations/findings, and my decision making to assure that the patient/family understand the plan of care. Time was allotted to answer questions at that time and throughout the ED course. Patient is required to maintain timely follow up, as discussed. I also discussed the potential for the development of an acute emergent condition  requiring further evaluation, return to the ER, admission, or even surgical intervention.  I encouraged the patient to return to the emergency department immediately for any concerns, worsening symptoms, new complaints, or if symptoms persist and they are unable to seek follow-up in a timely fashion. The patient/family expressed understanding and agreement with this plan    Shared decision making:   After full review of the patient's clinical presentation, review of any work-up including but not limited to laboratory studies and radiology obtained, I had a discussion with the patient.  Treatment options were discussed as well as the risks, benefits and consequences.  I discussed all findings with the patient and family members if available.  During the discussion, treatment goals were understood by all as well as any misconceptions which were addressed with the patient.  Ample time was given for any questions they may have had.  They are in agreement with the treatment plan as well as final disposition. [JK]      ED Course User Index  [JK] Cruz Camara MD                                     [facility] reviewed by Cruz Camara MD       Medical Decision Making  This is a 29-year-old female presented to the emergency department the headache.  The patient symptoms seem consistent with a migraine.  She has normal neurologic exam at this time.  Is no fever or meningismus..  Overall, the patient is nontoxic.  Afebrile.  IV access was established and the patient.  Placed on continuous telemetry monitoring.  Given the patient's presentation, differential is broad and will require further evaluation.  Workup initiated.      Differential diagnosis: Headache, tension headache, sinusitis, atypical migraine, acute kidney injury, electrolyte abnormality      Amount and/or Complexity of Data Reviewed  External Data Reviewed: labs, radiology, ECG and notes.     Details: External laboratories, imaging as well as notes  were reviewed personally by myself.  All relevant studies were used to guide decision making.     Date of previous record: 5/16/2024    Source of note: Primary cardiology    Summary:  Patient was seen and evaluated for routine visit.  I did review basic laboratory studies on file as well as a previous chest x-ray and EKG.  Records reviewed    Labs: ordered. Decision-making details documented in ED Course.    Risk  Prescription drug management.        Final diagnoses:   Atypical migraine       ED Disposition  ED Disposition       ED Disposition   Discharge    Condition   Stable    Comment   --               Brian Yung, DNP, APRN  [address]  [facility] [address]  [place] [id number] [phone]    Call in 1 day           Medication List        New Prescriptions      butalbital-acetaminophen-caffeine -40 MG per tablet  Commonly known as: FIORICET, ESGIC  Take 1 tablet by mouth Every 6 (Six) Hours As Needed for Headache for up to 2 days.               Where to Get Your Medications        These medications were sent to [facility] - [facility] [address] [facility] - [phone]  - [phone] [address] [address]      Phone: [phone]   butalbital-acetaminophen-caffeine -40 MG per tablet            Cruz Camara MD  08/06/24 0121

## 2024-08-08 ENCOUNTER — PATIENT ROUNDING (BHMG ONLY) (OUTPATIENT)
Dept: NEUROLOGY | Facility: CLINIC | Age: 29
End: 2024-08-08
Payer: COMMERCIAL

## 2024-08-12 ENCOUNTER — TELEPHONE (OUTPATIENT)
Dept: NEUROLOGY | Facility: CLINIC | Age: 29
End: 2024-08-12
Payer: COMMERCIAL

## 2024-08-12 NOTE — TELEPHONE ENCOUNTER
Spoke with the Pt to let them know for the labs ordered, they can go to any  lab to have them drawn.    Pt requested their labs be faxed to their PCP as they can draw them there.     Labs have been faxed over as requested.    Pt verbalized understanding.

## 2024-08-13 ENCOUNTER — TELEPHONE (OUTPATIENT)
Dept: NEUROLOGY | Facility: CLINIC | Age: 29
End: 2024-08-13
Payer: COMMERCIAL

## 2024-08-13 NOTE — TELEPHONE ENCOUNTER
ANTONIO GAMBINO NP FROM [facility] IN [place] CALLED AND WOULD LIKE YOU TO GIVE HER A CALL BACK WHEN YOU CAN. WOULD LIKE TO DISCUSS SOME THINGS CONCERNING THIS PATIENT  CALL BACK # [phone]

## 2024-08-20 ENCOUNTER — TELEPHONE (OUTPATIENT)
Dept: NEUROLOGY | Facility: CLINIC | Age: 29
End: 2024-08-20
Payer: COMMERCIAL

## 2024-08-20 ENCOUNTER — HOSPITAL ENCOUNTER (OUTPATIENT)
Dept: MRI IMAGING | Facility: HOSPITAL | Age: 29
Discharge: HOME | End: 2024-08-20
Payer: COMMERCIAL

## 2024-08-20 ENCOUNTER — HOSPITAL ENCOUNTER (OUTPATIENT)
Dept: NEUROLOGY | Facility: HOSPITAL | Age: 29
Discharge: HOME | End: 2024-08-20
Payer: COMMERCIAL

## 2024-08-20 DIAGNOSIS — H93.A9 PULSATILE TINNITUS: ICD-10-CM

## 2024-08-20 DIAGNOSIS — R51.9 NEW ONSET HEADACHE: ICD-10-CM

## 2024-08-20 DIAGNOSIS — R40.4 TRANSIENT ALTERATION OF AWARENESS: ICD-10-CM

## 2024-08-20 PROCEDURE — A9577 INJ MULTIHANCE: HCPCS | Performed by: NURSE PRACTITIONER

## 2024-08-20 PROCEDURE — 95816 EEG AWAKE AND DROWSY: CPT

## 2024-08-20 PROCEDURE — 70553 MRI BRAIN STEM W/O & W/DYE: CPT

## 2024-08-20 PROCEDURE — 0 GADOBENATE DIMEGLUMINE 529 MG/ML SOLUTION: Performed by: NURSE PRACTITIONER

## 2024-08-20 PROCEDURE — 70544 MR ANGIOGRAPHY HEAD W/O DYE: CPT

## 2024-08-20 RX ADMIN — GADOBENATE DIMEGLUMINE 15 ML: 529 INJECTION, SOLUTION INTRAVENOUS at 19:44

## 2024-08-20 NOTE — TELEPHONE ENCOUNTER
"Called patient and left vm with results.      ----- Message from Brian Yung sent at 8/20/2024  2:54 PM EDT -----  Relay     \"Please notify pt EEG showed no signs of epilepsy\"              .  "

## 2024-08-22 ENCOUNTER — TELEPHONE (OUTPATIENT)
Dept: NEUROLOGY | Facility: CLINIC | Age: 29
End: 2024-08-22
Payer: COMMERCIAL

## 2024-08-22 DIAGNOSIS — G93.5 CHIARI MALFORMATION TYPE I: Primary | ICD-10-CM

## 2024-08-22 DIAGNOSIS — R51.9 NEW ONSET HEADACHE: ICD-10-CM

## 2024-08-22 DIAGNOSIS — E23.6 ENLARGED PITUITARY GLAND: ICD-10-CM

## 2024-08-22 NOTE — TELEPHONE ENCOUNTER
Called pt and discussed MRI and MRV. She has Chiari 1 w new onset headaches that are severe. Will refer to Neurosurg for eval. Mild prominence of pituitary. Will refer to Endo for eval. Pt verbalized understanding.

## 2024-08-28 ENCOUNTER — OFFICE VISIT (OUTPATIENT)
Dept: NEUROSURGERY | Facility: CLINIC | Age: 29
End: 2024-08-28
Payer: COMMERCIAL

## 2024-08-28 VITALS — TEMPERATURE: 97.3 F | BODY MASS INDEX: 27.87 KG/M2 | WEIGHT: 157.3 LBS | HEIGHT: 63 IN

## 2024-08-28 DIAGNOSIS — Q04.8 CEREBELLAR TONSILLAR ECTOPIA: Primary | ICD-10-CM

## 2024-08-28 DIAGNOSIS — R51.9 CHRONIC DAILY HEADACHE: ICD-10-CM

## 2024-08-28 PROCEDURE — 99203 OFFICE O/P NEW LOW 30 MIN: CPT | Performed by: NEUROLOGICAL SURGERY

## 2024-08-28 RX ORDER — BUTALBITAL, ACETAMINOPHEN AND CAFFEINE 50; 325; 40 MG/1; MG/1; MG/1
TABLET ORAL
COMMUNITY
Start: 2024-08-19

## 2024-08-28 RX ORDER — BUPROPION HYDROCHLORIDE 150 MG/1
TABLET ORAL
COMMUNITY
Start: 2024-08-19

## 2024-08-28 NOTE — PROGRESS NOTES
Subjective     Chief Complaint: Cerebellar ectopia, headaches    Patient ID: Shari Kramer is a 29 y.o. female seen for consultation today at the request of  KIANNA Gómez*    History of Present Illness    This is a 29-year-old woman who began to experience unusual headaches going back to about June of this year.  She has several lancinating headaches every day which only last a few seconds.  She is unable to identify any exacerbating factors.  She also reports visual changes when she is having her head pain.  She was recently evaluated by neurologist who ordered an MRI of the brain.  This demonstrated some cerebellar ectopia and referral to my clinic was accordingly established.    She denies any nausea, vomiting, photophobia, scotoma, numbness, tingling, or weakness.    She does not have much in the way of medical abilities.  She denies alcohol, tobacco, and drug use.    The following portions of the patient's history were reviewed and updated as appropriate: allergies, current medications, past family history, past medical history, past social history, past surgical history and problem list.    Family history:   Family History   Problem Relation Age of Onset    Arthritis Mother     Hypertension Mother     Cancer Mother         Breast cancer    Diabetes type II Father     Hypertension Father     Diabetes Father         Type 2    Heart disease Father     Asthma Brother     Arthritis Maternal Grandmother     Cancer Maternal Grandmother         Breast cancer    Diabetes type II Maternal Grandfather     Hyperlipidemia Maternal Grandfather     Cancer Maternal Grandfather         Non-Hodgkin’s lymphoma    Diabetes Maternal Grandfather     Heart disease Maternal Grandfather     Heart disease Paternal Grandfather     Hypertension Paternal Grandfather     Breast cancer Paternal Grandfather     Hyperlipidemia Paternal Grandfather     Arthritis Paternal Grandmother     Thyroid disease Paternal Grandmother      "Asthma Maternal Uncle     COPD Maternal Uncle        Social history:   Social History     Socioeconomic History    Marital status:    Tobacco Use    Smoking status: Never    Smokeless tobacco: Never   Vaping Use    Vaping status: Never Used   Substance and Sexual Activity    Alcohol use: Never    Drug use: Never    Sexual activity: Yes     Partners: Male     Birth control/protection: OCP       Review of Systems   Neurological:  Positive for headaches.       Objective   Temperature 97.3 °F (36.3 °C), temperature source Temporal, height 160 cm (63\"), weight 71.4 kg (157 lb 4.8 oz), not currently breastfeeding.  Body mass index is 27.86 kg/m².    Physical Exam  Vitals and nursing note reviewed.   Constitutional:       General: She is not in acute distress.     Appearance: Normal appearance. She is well-developed. She is not toxic-appearing or diaphoretic.   HENT:      Head: Normocephalic and atraumatic.   Eyes:      General:         Right eye: No discharge.         Left eye: No discharge.      Pupils: Pupils are equal, round, and reactive to light.   Neck:      Thyroid: No thyromegaly.      Vascular: No JVD.      Trachea: Phonation normal. No tracheal deviation.   Cardiovascular:      Rate and Rhythm: Normal rate and regular rhythm.   Pulmonary:      Effort: Pulmonary effort is normal. No respiratory distress.      Breath sounds: No stridor. No wheezing.   Abdominal:      General: There is no distension.      Palpations: Abdomen is soft.      Tenderness: There is no abdominal tenderness.   Musculoskeletal:      Comments: Unremarkable range of motion of the cervical spine.  Lhermitte sign and Spurling sign are both absent.   Skin:     General: Skin is warm and dry.      Findings: No erythema.   Neurological:      Mental Status: She is alert and oriented to person, place, and time.      GCS: GCS eye subscore is 4. GCS verbal subscore is 5. GCS motor subscore is 6.      Cranial Nerves: No cranial nerve deficit.     "  Sensory: No sensory deficit.      Motor: No abnormal muscle tone.      Coordination: Coordination normal.      Gait: Gait normal.      Deep Tendon Reflexes: Reflexes are normal and symmetric. Reflexes normal.      Comments: CN II-XII grossly intact.    No pronator drift. FTN testing performed without dysmetria or bradykinesia.    Casual, toe raised, heel raised, and tandem gait testing are all performed unremarkably.  Station is intact.   Psychiatric:         Speech: Speech normal.         Behavior: Behavior normal.         Thought Content: Thought content normal.         Judgment: Judgment normal.         Assessment & Plan     Independent Review of Radiographic Studies:      Billable for my review is a MRI of the brain that was performed on August 20, 2024.  There is tonsillar descent which measures approximately 7-8 mm with CSF signal present circumferentially around the cervical medullary junction and cerebellar tonsils.  There is no prolonged signal within the spinal cord parenchyma to suggest compression.    Medical Decision Making:      This is a 29-year-old woman with incidentally discovered cerebellar ectopia.  There is no role for surgical intervention.  I will defer to neurology for the management of her chronic head pain.  Follow-up with neurosurgery as needed for new or worsening symptoms.    Diagnoses and all orders for this visit:    1. Cerebellar tonsillar ectopia (Primary)    2. Chronic daily headache        No follow-ups on file.           This document signed by MIKO Carr MD August 28, 2024 09:23 EDT

## 2024-08-29 ENCOUNTER — PATIENT ROUNDING (BHMG ONLY) (OUTPATIENT)
Dept: NEUROSURGERY | Facility: CLINIC | Age: 29
End: 2024-08-29
Payer: COMMERCIAL

## 2024-08-29 NOTE — PROGRESS NOTES
A MY-CHART MESSAGE HAS BEEN SENT TO THE PATIENT FOR PATIENT ROUNDING WITH [facility].

## 2024-09-18 ENCOUNTER — TELEPHONE (OUTPATIENT)
Dept: NEUROLOGY | Facility: CLINIC | Age: 29
End: 2024-09-18
Payer: COMMERCIAL